# Patient Record
Sex: FEMALE | Race: WHITE
[De-identification: names, ages, dates, MRNs, and addresses within clinical notes are randomized per-mention and may not be internally consistent; named-entity substitution may affect disease eponyms.]

---

## 2021-09-25 ENCOUNTER — HOSPITAL ENCOUNTER (EMERGENCY)
Dept: HOSPITAL 11 - JP.ED | Age: 26
Discharge: HOME | End: 2021-09-25
Payer: SELF-PAY

## 2021-09-25 DIAGNOSIS — S61.012A: Primary | ICD-10-CM
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
## 2021-09-25 NOTE — EDM.PDOC
ED HPI GENERAL MEDICAL PROBLEM





- General


Chief Complaint: Laceration


Stated Complaint: THUMB LACERATION CUTTING PICKLES


Time Seen by Provider: 09/25/21 18:30


Source of Information: Reports: Patient


History Limitations: Reports: No Limitations





- History of Present Illness


INITIAL COMMENTS - FREE TEXT/NARRATIVE: 


26-year-old female presenting to the ED for evaluation of a laceration to her 

left pad of the thumb.  The patient was cutting pickles to make eggs salad 

sandwich today when she slipped cutting her thumb.  She has a 1 cm flap 

laceration over the pad of the thumb.  Bleeding is currently controlled.  

Patient's last tetanus was in 2019.  Patient only has some anesthesia to the 

area that is avulsed but the remainder of the thumb is normal.  Good capillary 

refill.  The laceration goes into the dermal tissue but does not go into the 

subcutaneous tissue.





  ** Left Finger-Thumb


Pain Score (Numeric/FACES): 3





- Related Data


                                    Allergies











Allergy/AdvReac Type Severity Reaction Status Date / Time


 


No Known Allergies Allergy   Verified 09/25/21 18:45











Home Meds: 


                                    Home Meds





NK [No Known Home Meds]  09/25/21 [History]











ED ROS GENERAL





- Review of Systems


Review Of Systems: See Below


Constitutional: Reports: No Symptoms


Musculoskeletal: Reports: Hand Pain (Left thumb pain secondary to laceration)


Skin: Reports: Wound (1 cm avulsion laceration on the pad of the left thumb)





ED EXAM, SKIN/RASH


Exam: See Below


Exam Limited By: No Limitations


General Appearance: Alert, No Apparent Distress, Anxious


Extremities: Normal Range of Motion, Normal Capillary Refill, Other (1 cm flap 

laceration on the pad of the left thumb)


Neurological: Alert, Oriented, Normal Cognition, No Motor/Sensory Deficits


Skin: Warm, Dry, Wound/Incision (1 cm avulsion laceration on the pad of the left

 thumb going into the dermal tissue but not subcutaneous tissue.  Bleeding is 

currently controlled.)





ED SKIN PROCEDURES





- Laceration/Wound Repair


  ** Left Distal Digit - 1st (Thumb)


Appearance: Superficial, Other (Avulsion laceration)


Distal NVT: Neuro & Vascular Intact


Anesthetic Type: Local


Local Anesthesia - Lidocaine (Xylocaine): 1% Plain


Local Anesthetic Volume: 3cc


Skin Prep: Chlorhexidine (Hibiciens)


Exploration/Debridement/Repair: Wound Explored, In a Bloodless Field, Explored 

to Base


Closed with: Sutures


Lac/Wound length In cm: 1.0


Suture Size: 4-0


# of Sutures: 3


Suture Type: Nylon, Interrupted


Sterile Dressing Applied: Nurse


Tetanus Status Addressed: Yes


Complications: No





Course





- Vital Signs


Last Recorded V/S: 





                                Last Vital Signs











Temp  36.2 C   09/25/21 18:43


 


Pulse  75   09/25/21 18:43


 


Resp  16   09/25/21 18:43


 


BP  144/84 H  09/25/21 18:43


 


Pulse Ox  100   09/25/21 18:43














- Orders/Labs/Meds


Meds: 





Medications














Discontinued Medications














Generic Name Dose Route Start Last Admin





  Trade Name Leana  PRN Reason Stop Dose Admin


 


Bacitracin  1 dose  09/25/21 18:30 





  Bacitracin Oint 1 Gm U/D Packet  TOP  09/25/21 18:31 





  ONETIME ONE  


 


Lidocaine HCl  5 ml  09/25/21 18:30 





  Lidocaine 1% 5 Ml Sdv  INJECT  09/25/21 18:31 





  ONETIME ONE  














Departure





- Departure


Time of Disposition: 18:50


Disposition: Home, Self-Care 01


Clinical Impression: 


Laceration of left thumb


Qualifiers:


 Encounter type: initial encounter Damage to nail status: without damage Foreign

 body presence: without foreign body Qualified Code(s): S61.012A - Laceration 

without foreign body of left thumb without damage to nail, initial encounter








- Discharge Information


Referrals: 


PCP,None [Primary Care Provider] - 


Care Plan Goals: 


The sutures will need to be removed in 7 to 10 days which can be done at your 

primary care provider's office.  Please keep the wound clean and dry for the 

next 24 hours until the scab forms.  You will want a wear gloves when working in

anyone's mouth at work.  Apply a light coating of bacitracin to the wound twice 

daily for the next 3 to 4 days.  I have also included a prescription for 

cephalexin to treat for infection as you were cutting food when this occurred.  

Take Tylenol or ibuprofen for pain.





Sepsis Event Note (ED)





- Evaluation


Sepsis Screening Result: No Definite Risk





- Focused Exam


Vital Signs: 





                                   Vital Signs











  Temp Pulse Resp BP Pulse Ox


 


 09/25/21 18:43  36.2 C  75  16  144/84 H  100


 


 09/25/21 18:33  36.2 C  75  16  144/84 H  100














- Problem List & Annotations


(1) Laceration of left thumb


SNOMED Code(s): 22416059754306726


   Code(s): S61.012A - LACERATION W/O FB OF LEFT THUMB W/O DAMAGE TO NAIL, INIT 

 Status: Acute   Priority: Low   Current Visit: Yes   


Qualifiers: 


   Encounter type: initial encounter   Damage to nail status: without damage   

Foreign body presence: without foreign body   Qualified Code(s): S61.012A - 

Laceration without foreign body of left thumb without damage to nail, initial 

encounter   





- Problem List Review


Problem List Initiated/Reviewed/Updated: Yes